# Patient Record
Sex: MALE | Race: WHITE | Employment: PART TIME | URBAN - NONMETROPOLITAN AREA
[De-identification: names, ages, dates, MRNs, and addresses within clinical notes are randomized per-mention and may not be internally consistent; named-entity substitution may affect disease eponyms.]

---

## 2017-02-21 ENCOUNTER — TELEPHONE (OUTPATIENT)
Dept: PRIMARY CARE CLINIC | Age: 37
End: 2017-02-21

## 2022-02-22 ENCOUNTER — HOSPITAL ENCOUNTER (EMERGENCY)
Facility: HOSPITAL | Age: 42
Discharge: LEFT BEFORE DISCHARGE | End: 2022-02-22

## 2022-02-22 PROCEDURE — 99211 OFF/OP EST MAY X REQ PHY/QHP: CPT

## 2022-02-22 PROCEDURE — 99202 OFFICE O/P NEW SF 15 MIN: CPT

## 2022-02-22 NOTE — ED NOTES
"Pt up to triage desk stating \"You can just disregard my sign in paper. I think I am going to go\". I told pt we would be more than happy to see him. Pt states \" I don't like making my ride wait, and I have other things to do today.\" Pt informed to come back to see us if he changes his mind. Pt ambulated out of ed in no distress at this time     Jess Delaney RN  02/22/22 1216    "

## 2023-02-20 ENCOUNTER — OFFICE VISIT (OUTPATIENT)
Dept: ENT CLINIC | Age: 43
End: 2023-02-20
Payer: COMMERCIAL

## 2023-02-20 VITALS
DIASTOLIC BLOOD PRESSURE: 68 MMHG | BODY MASS INDEX: 23.36 KG/M2 | WEIGHT: 119 LBS | HEIGHT: 60 IN | SYSTOLIC BLOOD PRESSURE: 120 MMHG

## 2023-02-20 DIAGNOSIS — H65.92 LEFT NON-SUPPURATIVE OTITIS MEDIA: Primary | ICD-10-CM

## 2023-02-20 DIAGNOSIS — Z93.0 TRACHEOSTOMY IN PLACE (HCC): ICD-10-CM

## 2023-02-20 PROCEDURE — 99203 OFFICE O/P NEW LOW 30 MIN: CPT | Performed by: PHYSICIAN ASSISTANT

## 2023-02-20 RX ORDER — METOPROLOL SUCCINATE 25 MG/1
TABLET, EXTENDED RELEASE ORAL
COMMUNITY
Start: 2023-01-26

## 2023-02-20 RX ORDER — MELOXICAM 15 MG/1
TABLET ORAL
COMMUNITY
Start: 2023-01-19

## 2023-02-20 RX ORDER — CLINDAMYCIN HYDROCHLORIDE 300 MG/1
300 CAPSULE ORAL 3 TIMES DAILY
Qty: 30 CAPSULE | Refills: 0 | Status: SHIPPED | OUTPATIENT
Start: 2023-02-20 | End: 2023-03-02

## 2023-02-20 ASSESSMENT — ENCOUNTER SYMPTOMS
RHINORRHEA: 0
SINUS PAIN: 0
EYE PAIN: 0
SORE THROAT: 0
SINUS PRESSURE: 0
TROUBLE SWALLOWING: 0
VOICE CHANGE: 0
FACIAL SWELLING: 0
PHOTOPHOBIA: 0

## 2023-02-20 NOTE — ASSESSMENT & PLAN NOTE
Left otitis media- confirmed by microscopic exam  Plan: I will place the patient on clindamycin for 10 days. He is follow-up in 2 weeks for reevaluation.

## 2023-02-20 NOTE — PROGRESS NOTES
[facility] OTOLARYNGOLOGY/ENT  Jose Bedolla is a pleasant 30-year-old  male that was referred by Ramesh Knox due to problems with ear pain as well as request for trach management. Patient reports that he has had a trach since childhood and currently has a Shiley size 0 pediatric tube. He reports that the tube has been in for approximate 2 years due to no one making a replacement tube. He reports that he sporadically will have times where he feels like he cannot breathe from the site. He reports that he has never had any type of endoscopy for evaluation. He denies any dysphagia to include solids or liquids. Also denies any vocal changes. Patient reports that currently he is experiencing otalgia to the left ear and is requesting evaluation. He denies any drainage from the canals. Allergies: Ceclor [cefaclor]      Current Outpatient Medications   Medication Sig Dispense Refill    metoprolol succinate (TOPROL XL) 25 MG extended release tablet       meloxicam (MOBIC) 15 MG tablet       Multiple Vitamin (MULTI-VITAMIN DAILY PO) Take by mouth      clindamycin (CLEOCIN) 300 MG capsule Take 1 capsule by mouth 3 times daily for 10 days 30 capsule 0    montelukast (SINGULAIR) 10 MG tablet Take 10 mg by mouth nightly       No current facility-administered medications for this visit.        Past Surgical History:   Procedure Laterality Date    HERNIA REPAIR      TRACHEOTOMY      age 5    TYMPANOSTOMY TUBE PLACEMENT      x5       Past Medical History:   Diagnosis Date    Anxiety     Eating disorder     Mitral valve prolapse        Family History   Problem Relation Age of Onset    Hypertension Mother     Cancer Father         history of bladder cancer    Macular Degen Father     Heart Attack Maternal Grandfather     Cancer Paternal Grandmother        Social History     Tobacco Use    Smoking status: Never    Smokeless tobacco: Not on file   Substance Use Topics    Alcohol use: Not Currently     Comment: sober REVIEW OF SYSTEMS:  all other systems reviewed and are negative  Review of Systems   Constitutional:  Negative for chills and fever. HENT:  Negative for congestion, dental problem, ear discharge, ear pain, facial swelling, hearing loss, postnasal drip, rhinorrhea, sinus pressure, sinus pain, sore throat, tinnitus, trouble swallowing and voice change. Eyes:  Negative for photophobia and pain. Neurological:  Negative for dizziness and headaches. Comments:     PHYSICAL EXAM:    /68   Ht 4' (1.219 m)   Wt 119 lb (54 kg)   BMI 36.31 kg/m²   Body mass index is 36.31 kg/m². General Appearance: well developed  and well nourished  Head/ Face: normocephalic and atraumatic  Vocal Quality: good/ normal  Ears: Right Ear: External: external ears normal Otoscopy Ear Canal: canal clear Otoscopy TM: TM's dull Left Ear: External: external ears normal Otoscopy Ear Canal: canal clear Otoscopy TM: TM's erythematous  Hearing: grossly intact  Nose: nares normal and septum midline  Neck: supple and adenopathy none palpable  Thyroid: normal  Oral exam demonstrated the tongue to be midline with no masses or any abnormalities to the posterior pharynx. Evaluation of the trach site demonstrated a size 0 Shiley-please refer to media for dimensions and picture. No peripheral granulation tissue was noted. The base is noted to be fairly mobile with the patient noted with no evidence of a seal.    Assessment & Plan:    Problem List Items Addressed This Visit       Left non-suppurative otitis media - Primary     Left otitis media- confirmed by microscopic exam  Plan: I will place the patient on clindamycin for 10 days. He is follow-up in 2 weeks for reevaluation. Relevant Medications    clindamycin (CLEOCIN) 300 MG capsule    Tracheostomy in place [facility])     Tracheostomy in place-placed at birth-0 Shiley  Plan: I discussed this case with Dr. Smiley Watkins.   We will get a CAT scan of the neck for evaluation and then have him see Dr. Brenda Sutton for possible endoscopy with possible removal of the tracheal tube if indicated. I reviewed replacement sizes, patient would need a Shiley 3.5 with a 3.5 mm ID and a 5.2 mm OD with a 40 mm length that is cuffless if the tracheostomy has to be left in place. No orders of the defined types were placed in this encounter. Orders Placed This Encounter   Medications    clindamycin (CLEOCIN) 300 MG capsule     Sig: Take 1 capsule by mouth 3 times daily for 10 days     Dispense:  30 capsule     Refill:  0       Electronically signed by Cheli Rios PA-C on 2/20/23 at 4:17 PM CST        Please note that this chart was generated using dragon dictation software. Although every effort was made to ensure the accuracy of this automated transcription, some errors in transcription may have occurred.

## 2023-02-20 NOTE — ASSESSMENT & PLAN NOTE
Tracheostomy in place-placed at birth-0 Shiley  Plan: I discussed this case with Dr. Edie Linda. We will get a CAT scan of the neck for evaluation and then have him see Dr. Edie Linda for possible endoscopy with possible removal of the tracheal tube if indicated. I reviewed replacement sizes, patient would need a Shiley 3.5 with a 3.5 mm ID and a 5.2 mm OD with a 40 mm length that is cuffless if the tracheostomy has to be left in place.

## 2023-03-21 ENCOUNTER — OFFICE VISIT (OUTPATIENT)
Dept: ENT CLINIC | Age: 43
End: 2023-03-21
Payer: MEDICARE

## 2023-03-21 VITALS
BODY MASS INDEX: 23.36 KG/M2 | DIASTOLIC BLOOD PRESSURE: 66 MMHG | SYSTOLIC BLOOD PRESSURE: 122 MMHG | HEIGHT: 60 IN | WEIGHT: 119 LBS

## 2023-03-21 DIAGNOSIS — J95.03 TRACHEAL STENOSIS DUE TO TRACHEOSTOMY (HCC): Primary | ICD-10-CM

## 2023-03-21 DIAGNOSIS — H65.92 LEFT NON-SUPPURATIVE OTITIS MEDIA: ICD-10-CM

## 2023-03-21 DIAGNOSIS — Z93.0 TRACHEOSTOMY IN PLACE (HCC): ICD-10-CM

## 2023-03-21 PROCEDURE — G8484 FLU IMMUNIZE NO ADMIN: HCPCS | Performed by: PHYSICIAN ASSISTANT

## 2023-03-21 PROCEDURE — G8417 CALC BMI ABV UP PARAM F/U: HCPCS | Performed by: PHYSICIAN ASSISTANT

## 2023-03-21 PROCEDURE — 1036F TOBACCO NON-USER: CPT | Performed by: PHYSICIAN ASSISTANT

## 2023-03-21 PROCEDURE — G8427 DOCREV CUR MEDS BY ELIG CLIN: HCPCS | Performed by: PHYSICIAN ASSISTANT

## 2023-03-21 PROCEDURE — 99213 OFFICE O/P EST LOW 20 MIN: CPT | Performed by: PHYSICIAN ASSISTANT

## 2023-03-21 ASSESSMENT — ENCOUNTER SYMPTOMS
PHOTOPHOBIA: 0
SINUS PAIN: 0
SORE THROAT: 0
TROUBLE SWALLOWING: 0
VOICE CHANGE: 0
RHINORRHEA: 0
FACIAL SWELLING: 0
EYE PAIN: 0
SINUS PRESSURE: 0

## 2023-03-21 NOTE — ASSESSMENT & PLAN NOTE
Tracheostomy in place with questionable tracheal stenosis based on history  Plan: I recommended a CT of the neck to evaluate the passageway. Overall the patient may need a tracheal revision based on his history. I will call him the CT results with further recommendations to follow.

## 2023-03-21 NOTE — PROGRESS NOTES
[facility] OTOLARYNGOLOGY/ENT  Paulette Hernandez is a pleasant 68-year-old male that presents for follow-up after treatment for a right-sided otitis media. Patient reports that the ear still has some pressure sensation but is having no drainage. He is also requesting evaluation of his trach due to difficulty breathing through his trach. He reports that when he clamped this off he is only able to leave this clamp for about 2 to 3 minutes. He has had no recent radiological studies. Currently he has a pediatric tracheostomy tube that has been present for multiple years. Allergies: Ceclor [cefaclor]      Current Outpatient Medications   Medication Sig Dispense Refill    metoprolol succinate (TOPROL XL) 25 MG extended release tablet       meloxicam (MOBIC) 15 MG tablet       Multiple Vitamin (MULTI-VITAMIN DAILY PO) Take by mouth      montelukast (SINGULAIR) 10 MG tablet Take 10 mg by mouth nightly       No current facility-administered medications for this visit. Past Surgical History:   Procedure Laterality Date    HERNIA REPAIR      TRACHEOTOMY      age 5    TYMPANOSTOMY TUBE PLACEMENT      x5       Past Medical History:   Diagnosis Date    Anxiety     Eating disorder     Mitral valve prolapse        Family History   Problem Relation Age of Onset    Hypertension Mother     Cancer Father         history of bladder cancer    Macular Degen Father     Heart Attack Maternal Grandfather     Cancer Paternal Grandmother        Social History     Tobacco Use    Smoking status: Never    Smokeless tobacco: Not on file   Substance Use Topics    Alcohol use: Not Currently     Comment: sober           REVIEW OF SYSTEMS:  all other systems reviewed and are negative  Review of Systems   Constitutional:  Negative for chills and fever.    HENT:  Negative for congestion, dental problem, ear discharge, ear pain, facial swelling, hearing loss, postnasal drip, rhinorrhea, sinus pressure, sinus pain, sore throat, tinnitus, trouble

## 2023-07-18 ENCOUNTER — OFFICE VISIT (OUTPATIENT)
Dept: ENT CLINIC | Age: 43
End: 2023-07-18
Payer: MEDICARE

## 2023-07-18 VITALS
SYSTOLIC BLOOD PRESSURE: 122 MMHG | HEIGHT: 60 IN | BODY MASS INDEX: 23.36 KG/M2 | DIASTOLIC BLOOD PRESSURE: 78 MMHG | WEIGHT: 119 LBS

## 2023-07-18 DIAGNOSIS — J95.03 TRACHEAL STENOSIS DUE TO TRACHEOSTOMY (HCC): Primary | ICD-10-CM

## 2023-07-18 PROCEDURE — G8417 CALC BMI ABV UP PARAM F/U: HCPCS | Performed by: PHYSICIAN ASSISTANT

## 2023-07-18 PROCEDURE — G8427 DOCREV CUR MEDS BY ELIG CLIN: HCPCS | Performed by: PHYSICIAN ASSISTANT

## 2023-07-18 PROCEDURE — 99213 OFFICE O/P EST LOW 20 MIN: CPT | Performed by: PHYSICIAN ASSISTANT

## 2023-07-18 PROCEDURE — 1036F TOBACCO NON-USER: CPT | Performed by: PHYSICIAN ASSISTANT

## 2023-07-18 RX ORDER — ERGOCALCIFEROL 1.25 MG/1
CAPSULE ORAL
COMMUNITY
Start: 2023-06-21

## 2023-07-18 NOTE — PROGRESS NOTES
Laquita Romero is a pleasant 61-year-old  male that presents for evaluation of the ears due to possible recurrent cerumen impactions as well as to talk about the need for CT of the neck for tracheal revision. Patient reports that he has noticed the ears to be very itchy and feels like he has cerumen present. Patient has a history of having an myringotomy tube in place to the left side. He was initially ordered a CT of the soft tissue neck to rule out tracheal stenosis due to his trach size. He reports that due to the out-of-pocket cost being too much, he had canceled the procedure. Patient is still interested in a tracheal revision and is asking if he can get a CT elsewhere. Physical examination demonstrated the patient to have a small amount cerumen to the right ear that was removed with alligator graspers without any complications. The TM appeared to be normal.   Examination the left ear demonstrated a myringotomy tube to be present that is occluded with wax and appears to be nonfunctional.  There is no evidence of infection. A small amount of wax was removed without any issues. Neck exam demonstrated no lymphadenopathy or thyromegaly. Examination of the trach demonstrated the wound to be clean with no granulation tissue. The trach appears to be patent. Impression: Questionable tracheal stenosis with pediatric tracheostomy in place, nonfunctional myringotomy tube to the left ear with need for extraction    Plan: I recommended the patient have a CT at [address] [place] [address] for evaluation of the trach site. After the CT has been completed, I will have him to see Dr. Martha Wright to talk about possible surgery to include trach revision and removal of the myringotomy tube. Patient is agreeable with current plan and wishes to proceed.       Electronically signed by Yasmine Hill PA-C on 7/18/23 at 2:25 PM CDT

## 2024-09-12 ENCOUNTER — OFFICE VISIT (OUTPATIENT)
Dept: ENT CLINIC | Age: 44
End: 2024-09-12
Payer: MEDICARE

## 2024-09-12 VITALS
BODY MASS INDEX: 22.65 KG/M2 | OXYGEN SATURATION: 97 % | WEIGHT: 115.4 LBS | SYSTOLIC BLOOD PRESSURE: 126 MMHG | HEART RATE: 78 BPM | DIASTOLIC BLOOD PRESSURE: 84 MMHG | HEIGHT: 60 IN

## 2024-09-12 DIAGNOSIS — H61.23 BILATERAL IMPACTED CERUMEN: ICD-10-CM

## 2024-09-12 DIAGNOSIS — J95.03 TRACHEAL STENOSIS DUE TO TRACHEOSTOMY (HCC): Primary | ICD-10-CM

## 2024-09-12 PROBLEM — H65.92 LEFT NON-SUPPURATIVE OTITIS MEDIA: Status: RESOLVED | Noted: 2023-02-20 | Resolved: 2024-09-12

## 2024-09-12 PROCEDURE — 99213 OFFICE O/P EST LOW 20 MIN: CPT | Performed by: PHYSICIAN ASSISTANT

## 2024-09-12 PROCEDURE — G8417 CALC BMI ABV UP PARAM F/U: HCPCS | Performed by: PHYSICIAN ASSISTANT

## 2024-09-12 PROCEDURE — 69210 REMOVE IMPACTED EAR WAX UNI: CPT | Performed by: PHYSICIAN ASSISTANT

## 2024-09-12 PROCEDURE — G8427 DOCREV CUR MEDS BY ELIG CLIN: HCPCS | Performed by: PHYSICIAN ASSISTANT

## 2024-09-12 PROCEDURE — 1036F TOBACCO NON-USER: CPT | Performed by: PHYSICIAN ASSISTANT

## 2024-09-12 ASSESSMENT — ENCOUNTER SYMPTOMS
RHINORRHEA: 0
SINUS PAIN: 0
TROUBLE SWALLOWING: 0
FACIAL SWELLING: 0
SORE THROAT: 0
EYE PAIN: 0
VOICE CHANGE: 0
PHOTOPHOBIA: 0
SINUS PRESSURE: 0

## 2025-05-08 ENCOUNTER — OFFICE VISIT (OUTPATIENT)
Dept: ENT CLINIC | Age: 45
End: 2025-05-08
Payer: MEDICARE

## 2025-05-08 VITALS — BODY MASS INDEX: 21.99 KG/M2 | WEIGHT: 112 LBS | HEIGHT: 60 IN

## 2025-05-08 DIAGNOSIS — Z93.0 TRACHEOSTOMY IN PLACE (HCC): Primary | ICD-10-CM

## 2025-05-08 DIAGNOSIS — J95.03 TRACHEAL STENOSIS DUE TO TRACHEOSTOMY (HCC): ICD-10-CM

## 2025-05-08 DIAGNOSIS — H61.23 BILATERAL IMPACTED CERUMEN: ICD-10-CM

## 2025-05-08 PROCEDURE — G8417 CALC BMI ABV UP PARAM F/U: HCPCS | Performed by: PHYSICIAN ASSISTANT

## 2025-05-08 PROCEDURE — 99213 OFFICE O/P EST LOW 20 MIN: CPT | Performed by: PHYSICIAN ASSISTANT

## 2025-05-08 PROCEDURE — G8427 DOCREV CUR MEDS BY ELIG CLIN: HCPCS | Performed by: PHYSICIAN ASSISTANT

## 2025-05-08 PROCEDURE — 1036F TOBACCO NON-USER: CPT | Performed by: PHYSICIAN ASSISTANT

## 2025-05-08 PROCEDURE — 69210 REMOVE IMPACTED EAR WAX UNI: CPT | Performed by: PHYSICIAN ASSISTANT

## 2025-05-08 ASSESSMENT — ENCOUNTER SYMPTOMS
EYE PAIN: 0
SORE THROAT: 0
RHINORRHEA: 0
TROUBLE SWALLOWING: 0
PHOTOPHOBIA: 0
VOICE CHANGE: 0
FACIAL SWELLING: 0
SINUS PRESSURE: 0
SINUS PAIN: 0

## 2025-05-08 NOTE — ASSESSMENT & PLAN NOTE
Bilateral cerumen impaction-successfully removed with microscopy and instrumentation  Plan: I recommended a follow-up in 6 months for cerumen check.  He was reminded to call if he has any questions or concerns.

## 2025-05-08 NOTE — ASSESSMENT & PLAN NOTE
Tracheostomy in place with questionable tracheal stenosis  Plan: I recommended a CT of the soft tissue neck for evaluation of the trach site.  I will call in the results once completed.  He has requested to wait on the CT until July due to his finances.

## 2025-05-08 NOTE — PROGRESS NOTES
[facility] OTOLARYNGOLOGY/ENT  Eliu is a pleasant 45-year-old  male that presents to the clinic with complaints of asymmetrical hearing loss of the right ear as well as reevaluation of the tracheostomy.  He has a longstanding history of having a trach that was placed at 3 yrs. of age.  He reports of having no obvious breathing issues.  He was initially scheduled for a CT of the tracheostomy last year for possible removal of the tracheostomy.  Due to cost issues this was canceled.  He describes the hearing issue as muffled with no drainage from the canals.        Allergies: Ceclor [cefaclor]      Current Outpatient Medications   Medication Sig Dispense Refill    metoprolol succinate (TOPROL XL) 25 MG extended release tablet       meloxicam (MOBIC) 15 MG tablet       Multiple Vitamin (MULTI-VITAMIN DAILY PO) Take by mouth      montelukast (SINGULAIR) 10 MG tablet Take 1 tablet by mouth nightly      vitamin D (ERGOCALCIFEROL) 1.25 MG (11946 UT) CAPS capsule  (Patient not taking: Reported on 5/8/2025)       No current facility-administered medications for this visit.       Past Surgical History:   Procedure Laterality Date    HERNIA REPAIR      TRACHEOTOMY      age 3    TYMPANOSTOMY TUBE PLACEMENT      x5       Past Medical History:   Diagnosis Date    Anxiety     Eating disorder     Mitral valve prolapse        Family History   Problem Relation Age of Onset    Hypertension Mother     Cancer Father         history of bladder cancer    Macular Degen Father     Heart Attack Maternal Grandfather     Cancer Paternal Grandmother        Social History     Tobacco Use    Smoking status: Never    Smokeless tobacco: Never   Substance Use Topics    Alcohol use: Not Currently     Comment: sober           REVIEW OF SYSTEMS:  all other systems reviewed and are negative  Review of Systems   Constitutional:  Negative for chills and fever.   HENT:  Negative for congestion, dental problem, ear discharge, ear pain, facial